# Patient Record
Sex: MALE | Race: WHITE | Employment: UNEMPLOYED | URBAN - METROPOLITAN AREA
[De-identification: names, ages, dates, MRNs, and addresses within clinical notes are randomized per-mention and may not be internally consistent; named-entity substitution may affect disease eponyms.]

---
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
## 2025-07-01 ENCOUNTER — TRANSFERRED RECORDS (OUTPATIENT)
Dept: ADMINISTRATIVE | Facility: CLINIC | Age: 20
End: 2025-07-01
Payer: COMMERCIAL

## 2025-07-01 NOTE — PROCEDURES
[facility]   [address], [address], [place] [address]     Patient Name: Mike Donaldson  Gender:   Male  Exam Date: 07/01/2025 Visit Number:   [id number]  Age: 20 Years 5 Months YOB: 2005  Attending MD: Chuck Fox MD Medical Record#:   [id number]  -----------------------------------------------------------------------------------------------------------------------------   Procedure: Colonoscopy   Indications: Colorectal cancer screening   Father with Paz syndrome, mother with FAP   Referring MD: Referral Self  Primary MD:      Ramon Raymond PAC   Medications: Admitting Medications:   0.9% Normal Saline at [facility]   Intra Procedure Medications:   Patient received monitored anesthesia care.     Complications: No immediate complications  ______________________________________________________________________________  Procedure:   An examination of the heart and lungs was performed and found to be within acceptable limits.  The patient was therefore deemed a reasonable candidate for endoscopy and monitored anesthesia care.   The risks, benefits and plan of the procedure were discussed with the patient and/or patient representative and all questions were answered.  After obtaining informed consent, the patient received monitored anesthesia care and I passed the scope   without difficulty via the rectum to the ileum.  The appendiceal orifice and ic valve were identified.  The scope was retroflexed during the examination  The quality of the prep was good  (Miralax/Gatorade Double Prep).    This was a complete examination throughout the entire colon.    Findings:    Normal finding.  Location - ileum.    Hemorrhoids.  Internal hemorrhoids without bleeding.    The entire colon was normal.    Impression:  Hemorrhoids, internal   Family history of FAP (familial adenomatous polyposis)   Family history of Paz syndrome     Plan  Repeat colonoscopy in 1 year.    We will  attempt to contact you at appropriate intervals via [place] mail.  We may not be able to find you or contact you at that time, therefore you should know that the responsibility for following our recommendation rests with you.  If you don't hear from us at the time your procedure is due, please contact our office to schedule an appointment.  If your contact information should change, please contact our office so that we can update your records.  Return to your primary care provider  as needed.  Recommendation Comments:  A colonoscopy can be repeated yearly and an EGD every 5 years (next one 4 years from now), according to the family after meeting with the .        Electronically signed by:___________________  Chuck Fox MD                 07/01/2025    Allergies:  Medication Name Ingredient Reaction Comment   Penicillin PENICILLIN Hives      Vital Signs:  Date Time Systolic Diastolic Height Weight BMI   07/01/2025 1228  63 72 in 179.99 24.40     Race:  White  Preferred Language:  English      cc: Ramon HORNER  cc: Ramon HORNER  cc: Meagan Martinez [facility] [phone]